# Patient Record
Sex: MALE | Race: WHITE
[De-identification: names, ages, dates, MRNs, and addresses within clinical notes are randomized per-mention and may not be internally consistent; named-entity substitution may affect disease eponyms.]

---

## 2019-12-07 ENCOUNTER — HOSPITAL ENCOUNTER (EMERGENCY)
Dept: HOSPITAL 54 - ER | Age: 29
Discharge: HOME | End: 2019-12-07
Payer: COMMERCIAL

## 2019-12-07 VITALS — WEIGHT: 147 LBS | BODY MASS INDEX: 24.49 KG/M2 | HEIGHT: 65 IN

## 2019-12-07 VITALS — DIASTOLIC BLOOD PRESSURE: 76 MMHG | SYSTOLIC BLOOD PRESSURE: 138 MMHG

## 2019-12-07 DIAGNOSIS — D89.9: ICD-10-CM
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
## 2019-12-07 DIAGNOSIS — Z60.2: ICD-10-CM
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
## 2019-12-07 DIAGNOSIS — S61.012A: Primary | ICD-10-CM

## 2019-12-07 DIAGNOSIS — F17.200: ICD-10-CM
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
## 2019-12-07 PROCEDURE — A6403 STERILE GAUZE>16 <= 48 SQ IN: HCPCS

## 2019-12-07 PROCEDURE — 99283 EMERGENCY DEPT VISIT LOW MDM: CPT

## 2019-12-07 PROCEDURE — 90471 IMMUNIZATION ADMIN: CPT

## 2019-12-07 PROCEDURE — 90715 TDAP VACCINE 7 YRS/> IM: CPT

## 2019-12-07 PROCEDURE — 12002 RPR S/N/AX/GEN/TRNK2.6-7.5CM: CPT

## 2019-12-07 SDOH — SOCIAL STABILITY - SOCIAL INSECURITY: PROBLEMS RELATED TO LIVING ALONE: Z60.2

## 2019-12-07 NOTE — NUR
PATIENT BIB SELF W/ FRIEND TO ER C/O LEFT THUMB LACERATION. PATIENT STATES HE 
WAS REACHING FOR A BROKEN VASE ON A SHELF THAT HAD CUT HIS LEFT THUMB 30MIN 
PTA. AAOX4. MINIMAL DARK RED BLEEDING NOTED. STERILE GAUZE GIVEN TO STOP 
BLEEDING. NO SOB. NOT IN ANY DISTRESS. BREATHING EVENLY AND UNLABORED. 
CONNECTED TO MONITOR.